# Patient Record
Sex: FEMALE | Race: OTHER | NOT HISPANIC OR LATINO | Employment: OTHER | URBAN - METROPOLITAN AREA
[De-identification: names, ages, dates, MRNs, and addresses within clinical notes are randomized per-mention and may not be internally consistent; named-entity substitution may affect disease eponyms.]

---

## 2017-02-03 ENCOUNTER — HISTORICAL (OUTPATIENT)
Dept: INTERNAL MEDICINE | Facility: CLINIC | Age: 42
End: 2017-02-03

## 2017-02-09 ENCOUNTER — HISTORICAL (OUTPATIENT)
Dept: RADIOLOGY | Facility: HOSPITAL | Age: 42
End: 2017-02-09

## 2017-02-23 ENCOUNTER — HISTORICAL (OUTPATIENT)
Dept: RADIOLOGY | Facility: HOSPITAL | Age: 42
End: 2017-02-23

## 2017-03-18 ENCOUNTER — HISTORICAL (OUTPATIENT)
Dept: SLEEP MEDICINE | Facility: HOSPITAL | Age: 42
End: 2017-03-18

## 2017-08-23 ENCOUNTER — HISTORICAL (OUTPATIENT)
Dept: ADMINISTRATIVE | Facility: HOSPITAL | Age: 42
End: 2017-08-23

## 2022-04-07 ENCOUNTER — HISTORICAL (OUTPATIENT)
Dept: ADMINISTRATIVE | Facility: HOSPITAL | Age: 47
End: 2022-04-07

## 2022-04-23 VITALS
HEIGHT: 67 IN | BODY MASS INDEX: 45.99 KG/M2 | SYSTOLIC BLOOD PRESSURE: 136 MMHG | DIASTOLIC BLOOD PRESSURE: 86 MMHG | WEIGHT: 293 LBS

## 2022-08-18 ENCOUNTER — HOSPITAL ENCOUNTER (EMERGENCY)
Facility: HOSPITAL | Age: 47
Discharge: HOME | End: 2022-08-18
Attending: EMERGENCY MEDICINE
Payer: MEDICARE

## 2022-08-18 VITALS
TEMPERATURE: 98 F | SYSTOLIC BLOOD PRESSURE: 147 MMHG | OXYGEN SATURATION: 98 % | HEART RATE: 88 BPM | HEIGHT: 67 IN | RESPIRATION RATE: 18 BRPM | DIASTOLIC BLOOD PRESSURE: 93 MMHG | BODY MASS INDEX: 45.99 KG/M2 | WEIGHT: 293 LBS

## 2022-08-18 DIAGNOSIS — F51.01 PRIMARY INSOMNIA: ICD-10-CM

## 2022-08-18 DIAGNOSIS — M72.2 PLANTAR FASCIITIS OF LEFT FOOT: Primary | ICD-10-CM

## 2022-08-18 PROCEDURE — 63600175 PHARM REV CODE 636 W HCPCS: Performed by: EMERGENCY MEDICINE

## 2022-08-18 PROCEDURE — 96372 THER/PROPH/DIAG INJ SC/IM: CPT | Performed by: EMERGENCY MEDICINE

## 2022-08-18 PROCEDURE — 99284 EMERGENCY DEPT VISIT MOD MDM: CPT | Mod: 25

## 2022-08-18 RX ORDER — IBUPROFEN 800 MG/1
800 TABLET ORAL EVERY 8 HOURS PRN
Qty: 20 TABLET | Refills: 0 | Status: SHIPPED | OUTPATIENT
Start: 2022-08-18

## 2022-08-18 RX ORDER — METHYLPREDNISOLONE SOD SUCC 125 MG
125 VIAL (EA) INJECTION ONCE
Status: COMPLETED | OUTPATIENT
Start: 2022-08-18 | End: 2022-08-18

## 2022-08-18 RX ADMIN — METHYLPREDNISOLONE SODIUM SUCCINATE 125 MG: 125 INJECTION, POWDER, FOR SOLUTION INTRAMUSCULAR; INTRAVENOUS at 09:08

## 2022-08-18 NOTE — Clinical Note
"Risa"Ana Brennan was seen and treated in our emergency department on 8/18/2022.  She may return to work on 08/20/2022.       If you have any questions or concerns, please don't hesitate to call.      Ruchi Argueta MD"

## 2022-08-18 NOTE — ED PROVIDER NOTES
Encounter Date: 8/18/2022       History     Chief Complaint   Patient presents with    foot pain     Pt to er c/o left foot pain onset months ago, denies injury.     47-year-old female complains of chronic left foot pain, particularly at the heel, worse when she 1st gets out of bed and steps down on the floor.  She also has developed over the last few days pain over the proximal left 5th metatarsal region and some swelling to the lateral left ankle.  She has no known injuries.  She has chronic back pain, chronic hip pain, and problems with insomnia as well.        Review of patient's allergies indicates:   Allergen Reactions    Codeine      No past medical history on file.  No past surgical history on file.  No family history on file.   PMHx:   Obesity, chronic back pain  PSurgHx:  Back surgery  Review of Systems   Musculoskeletal: Positive for arthralgias, back pain and joint swelling.   All other systems reviewed and are negative.      Physical Exam     Initial Vitals [08/18/22 0758]   BP Pulse Resp Temp SpO2   (!) 147/93 88 18 98.2 °F (36.8 °C) 98 %      MAP       --         Physical Exam    Nursing note and vitals reviewed.  Constitutional: She appears well-developed and well-nourished. She is not diaphoretic. No distress.   HENT:   Head: Normocephalic and atraumatic.   Cardiovascular: Intact distal pulses.   Pulmonary/Chest: No respiratory distress. She has no wheezes. She has no rhonchi. She has no rales.   Abdominal: She exhibits no distension. There is no abdominal tenderness. There is no guarding.   Musculoskeletal:         General: No tenderness or edema. Normal range of motion.      Comments: Mild swelling noted to left lateral ankle with mild tenderness inferior to the lateral malleolus and mild tenderness over the proximal 5th metatarsal region.  She also has mild tenderness to the heel and plantar fascia, no palpable abnormality.  Ankle and foot and toes has full range of motion, normal sensation, 2+  pedal pulse.     Neurological: She is alert and oriented to person, place, and time.   Skin: Skin is warm and dry. Capillary refill takes less than 2 seconds. No rash noted.   Psychiatric: She has a normal mood and affect. Thought content normal.         ED Course   Procedures  Labs Reviewed - No data to display       Imaging Results          X-Ray Foot Complete Left (In process)               X-Rays:   Independently Interpreted Readings:   Other Readings:  Preliminary reading of the left foot x-ray is negative.    Medications   methylPREDNISolone sodium succinate injection 125 mg (has no administration in time range)     Medical Decision Making:   ED Management:  Patient has chronic left foot pain, hip pain, and back pain.  She is currently trying to do a keto diet for weight loss I she does understand the excess weight is exacerbating her chronic pain.  She also has problems with insomnia, drinks a lot of coffee, takes frequent afternoon naps, and has other habits related to poor sleep.  I have discussed diet and sleep habits with her as a may way to manage her chronic pain.  She would like to do a cortisone shot to help with her back in joint pain and we have discussed this as only providing short-term relief, a day or to.  I will give her prescription for ibuprofen.  Recommended that she follow-up with her primary care provider.                        Clinical Impression:   Final diagnoses:  [M72.2] Plantar fasciitis of left foot (Primary)  [F51.01] Primary insomnia          ED Disposition Condition    Discharge Stable        ED Prescriptions     Medication Sig Dispense Start Date End Date Auth. Provider    ibuprofen (ADVIL,MOTRIN) 800 MG tablet Take 1 tablet (800 mg total) by mouth every 8 (eight) hours as needed for Pain. 20 tablet 8/18/2022  Ruchi Argueta MD        Follow-up Information     Follow up With Specialties Details Why Contact Info    PCP  Schedule an appointment as soon as possible for a  visit in 1 week             Ruchi Argueta MD  08/18/22 0904       Ruchi Argueta MD  08/18/22 0905       Ruchi Argueta MD  08/18/22 0906       Ruchi Argueta MD  08/18/22 0915

## 2023-05-11 ENCOUNTER — HOSPITAL ENCOUNTER (OUTPATIENT)
Dept: RADIOLOGY | Facility: HOSPITAL | Age: 48
Discharge: HOME | End: 2023-05-11
Attending: INTERNAL MEDICINE
Payer: MEDICARE

## 2023-05-11 DIAGNOSIS — M25.511 RIGHT SHOULDER PAIN: Primary | ICD-10-CM

## 2023-05-11 DIAGNOSIS — M54.50 LOWER BACK PAIN: ICD-10-CM

## 2023-05-11 DIAGNOSIS — M25.511 RIGHT SHOULDER PAIN: ICD-10-CM

## 2023-05-11 PROCEDURE — 73030 X-RAY EXAM OF SHOULDER: CPT | Mod: TC,RT

## 2023-05-11 PROCEDURE — 72100 X-RAY EXAM L-S SPINE 2/3 VWS: CPT | Mod: TC

## 2023-06-05 DIAGNOSIS — M48.061 LUMBAR SPINAL STENOSIS: Primary | ICD-10-CM

## 2023-06-05 DIAGNOSIS — M75.101 RIGHT ROTATOR CUFF TEAR: ICD-10-CM

## 2023-06-06 DIAGNOSIS — M48.061 LUMBAR STENOSIS: Primary | ICD-10-CM

## 2023-06-06 DIAGNOSIS — S40.011A CONTUSION OF RIGHT SHOULDER: ICD-10-CM

## 2023-06-25 ENCOUNTER — HOSPITAL ENCOUNTER (EMERGENCY)
Facility: HOSPITAL | Age: 48
Discharge: HOME | End: 2023-06-25
Attending: EMERGENCY MEDICINE | Admitting: NURSE PRACTITIONER
Payer: MEDICARE

## 2023-06-25 VITALS
HEIGHT: 67 IN | TEMPERATURE: 98 F | SYSTOLIC BLOOD PRESSURE: 165 MMHG | HEART RATE: 75 BPM | DIASTOLIC BLOOD PRESSURE: 99 MMHG | BODY MASS INDEX: 45.99 KG/M2 | RESPIRATION RATE: 18 BRPM | OXYGEN SATURATION: 98 % | WEIGHT: 293 LBS

## 2023-06-25 DIAGNOSIS — M25.571 RIGHT ANKLE PAIN: ICD-10-CM

## 2023-06-25 DIAGNOSIS — S93.401A SPRAIN OF RIGHT ANKLE, UNSPECIFIED LIGAMENT, INITIAL ENCOUNTER: Primary | ICD-10-CM

## 2023-06-25 PROCEDURE — 96372 THER/PROPH/DIAG INJ SC/IM: CPT | Performed by: NURSE PRACTITIONER

## 2023-06-25 PROCEDURE — 63600175 PHARM REV CODE 636 W HCPCS: Performed by: NURSE PRACTITIONER

## 2023-06-25 PROCEDURE — 99284 EMERGENCY DEPT VISIT MOD MDM: CPT

## 2023-06-25 RX ORDER — ERGOCALCIFEROL 1.25 MG/1
CAPSULE ORAL
COMMUNITY
Start: 2023-05-15

## 2023-06-25 RX ORDER — CYCLOBENZAPRINE HCL 10 MG
TABLET ORAL
COMMUNITY
Start: 2023-05-11

## 2023-06-25 RX ORDER — ALPRAZOLAM 1 MG/1
1 TABLET ORAL 2 TIMES DAILY PRN
COMMUNITY

## 2023-06-25 RX ORDER — DEXAMETHASONE SODIUM PHOSPHATE 4 MG/ML
8 INJECTION, SOLUTION INTRA-ARTICULAR; INTRALESIONAL; INTRAMUSCULAR; INTRAVENOUS; SOFT TISSUE
Status: COMPLETED | OUTPATIENT
Start: 2023-06-25 | End: 2023-06-25

## 2023-06-25 RX ORDER — ATORVASTATIN CALCIUM 40 MG/1
TABLET, FILM COATED ORAL
COMMUNITY
Start: 2023-05-15

## 2023-06-25 RX ORDER — HYDROCODONE BITARTRATE AND ACETAMINOPHEN 5; 325 MG/1; MG/1
1 TABLET ORAL EVERY 8 HOURS PRN
Qty: 12 TABLET | Refills: 0 | Status: SHIPPED | OUTPATIENT
Start: 2023-06-25 | End: 2023-06-29

## 2023-06-25 RX ORDER — ESCITALOPRAM OXALATE 20 MG/1
20 TABLET ORAL
COMMUNITY
Start: 2023-05-11

## 2023-06-25 RX ADMIN — DEXAMETHASONE SODIUM PHOSPHATE 8 MG: 4 INJECTION, SOLUTION INTRA-ARTICULAR; INTRALESIONAL; INTRAMUSCULAR; INTRAVENOUS; SOFT TISSUE at 03:06

## 2023-06-25 NOTE — DISCHARGE INSTRUCTIONS
Ice, elevate, walking boot. May take norco as needed for more severe pain, do not take and drive.

## 2023-06-25 NOTE — ED PROVIDER NOTES
Encounter Date: 2023       History     Chief Complaint   Patient presents with    Ankle Pain     Pt to er c/o pain to right ankle when getting out of a recliner three days ago. Pt states also has swelling and bruising to right ankle.     See MDM    The history is provided by the patient. No  was used.   Review of patient's allergies indicates:   Allergen Reactions    Codeine      Past Medical History:   Diagnosis Date    Arthritis     spine    Asthma     Bone spur     back    Bursitis of both hips     Chronic back pain     Morbid obesity      Past Surgical History:   Procedure Laterality Date    BACK SURGERY      L4     SECTION      x4    TONSILLECTOMY      TUBAL LIGATION       No family history on file.     Review of Systems   Musculoskeletal:  Positive for joint swelling (right ankle pain).   All other systems reviewed and are negative.    Physical Exam     Initial Vitals [23 1439]   BP Pulse Resp Temp SpO2   (!) 142/118 90 20 97.9 °F (36.6 °C) 98 %      MAP       --         Physical Exam    Nursing note and vitals reviewed.  Constitutional: She appears well-developed and well-nourished.   Cardiovascular:  Regular rhythm and intact distal pulses.           Pulmonary/Chest: No respiratory distress.   Musculoskeletal:      Right ankle: Swelling and ecchymosis present. No deformity. Tenderness present over the lateral malleolus. Decreased range of motion.      Left ankle: Normal.      Comments: All other adjacent joints otherwise normal       Neurological: She is alert and oriented to person, place, and time.   Skin: Skin is warm and dry.   Psychiatric: She has a normal mood and affect.       ED Course   Procedures  Labs Reviewed - No data to display       Imaging Results              X-Ray Ankle Complete Right (Final result)  Result time 23 15:18:54      Final result by Phi Roe MD (23 15:18:54)                   Impression:      1. No acute displaced  fracture or dislocation.      Electronically signed by: Phi Roe MD  Date:    06/25/2023  Time:    15:18               Narrative:    EXAMINATION:  XR ANKLE COMPLETE 3 VIEW RIGHT    CLINICAL HISTORY:  Right ankle pain.    TECHNIQUE:  Three views of the right ankle.    COMPARISON:  None    FINDINGS:  No acute displaced fracture, subluxation, or dislocation is identified.  Ankle mortise is preserved.  There is calcaneal spurring.  No radiopaque foreign bodies are identified.  There is subcutaneous edema diffusely.                                       Medications   dexAMETHasone injection 8 mg (has no administration in time range)     Medical Decision Making:   Differential Diagnosis:   Includes but not limited to ankle sprain, ankle fracture, foot sprain  Clinical Tests:   Radiological Study: Ordered and Reviewed  ED Management:  49 y/o female presents with rolling her right ankle Thursday when she went to stand from the recliner. She has hx neuropathy in her foot so she didn't realize it was numb and when she stood it rolled out and then rolled back in. She has been elevating, ice and trying to not walk on it a lot but states now bruising and still swollen so wants to make sure its okay     Xray neg. Will place in walking boot. She did now request steroid injection for her nerve pain from her back that is flaring up since she is walking funny due to the ankle. She denies being DM and states she hasn't had one in 2 years.                         Clinical Impression:   Final diagnoses:  [M25.571] Right ankle pain  [S93.401A] Sprain of right ankle, unspecified ligament, initial encounter (Primary)        ED Disposition Condition    Discharge Stable          ED Prescriptions       Medication Sig Dispense Start Date End Date Auth. Provider    HYDROcodone-acetaminophen (NORCO) 5-325 mg per tablet Take 1 tablet by mouth every 8 (eight) hours as needed for Pain. 12 tablet 6/25/2023 6/29/2023 [facility]           Follow-up Information       Follow up With Specialties Details Why Contact Info    Rachel Dey MD Internal Medicine Call in 1 week As needed, If symptoms worsen [address] [facility] [id number]  [phone]               Estefany Hurst, MONTSE  06/25/23 9118

## 2023-06-25 NOTE — ED TRIAGE NOTES
Pt to er c/o pain to right ankle when getting out of a recliner three days ago. Pt states also has swelling and bruising to right ankle.

## 2023-07-05 ENCOUNTER — APPOINTMENT (OUTPATIENT)
Dept: RADIOLOGY | Facility: HOSPITAL | Age: 48
End: 2023-07-05
Attending: INTERNAL MEDICINE
Payer: MEDICARE

## 2023-07-05 DIAGNOSIS — S40.011A CONTUSION OF RIGHT SHOULDER: ICD-10-CM

## 2023-07-05 PROCEDURE — 73221 MRI JOINT UPR EXTREM W/O DYE: CPT | Mod: TC,RT

## 2023-11-13 ENCOUNTER — HOSPITAL ENCOUNTER (OUTPATIENT)
Dept: RADIOLOGY | Facility: HOSPITAL | Age: 48
Discharge: HOME | End: 2023-11-13
Attending: FAMILY MEDICINE
Payer: MEDICARE

## 2023-11-13 ENCOUNTER — OFFICE VISIT (OUTPATIENT)
Dept: URGENT CARE | Facility: CLINIC | Age: 48
End: 2023-11-13
Payer: MEDICARE

## 2023-11-13 VITALS
DIASTOLIC BLOOD PRESSURE: 95 MMHG | HEIGHT: 66 IN | OXYGEN SATURATION: 99 % | SYSTOLIC BLOOD PRESSURE: 167 MMHG | BODY MASS INDEX: 47.09 KG/M2 | WEIGHT: 293 LBS | HEART RATE: 72 BPM | TEMPERATURE: 99 F | RESPIRATION RATE: 20 BRPM

## 2023-11-13 DIAGNOSIS — U07.1 COVID-19: Primary | ICD-10-CM

## 2023-11-13 DIAGNOSIS — R09.81 NASAL CONGESTION: ICD-10-CM

## 2023-11-13 DIAGNOSIS — U07.1 COVID-19: ICD-10-CM

## 2023-11-13 LAB
CTP QC/QA: YES
CTP QC/QA: YES
POC MOLECULAR INFLUENZA A AGN: NEGATIVE
POC MOLECULAR INFLUENZA B AGN: NEGATIVE
SARS-COV-2 RDRP RESP QL NAA+PROBE: POSITIVE

## 2023-11-13 PROCEDURE — 99215 OFFICE O/P EST HI 40 MIN: CPT | Mod: PBBFAC,25 | Performed by: FAMILY MEDICINE

## 2023-11-13 PROCEDURE — 99204 OFFICE O/P NEW MOD 45 MIN: CPT | Mod: S$PBB,,, | Performed by: FAMILY MEDICINE

## 2023-11-13 PROCEDURE — 87502 INFLUENZA DNA AMP PROBE: CPT | Mod: PBBFAC | Performed by: FAMILY MEDICINE

## 2023-11-13 PROCEDURE — 87635 SARS-COV-2 COVID-19 AMP PRB: CPT | Mod: PBBFAC | Performed by: FAMILY MEDICINE

## 2023-11-13 PROCEDURE — 99204 PR OFFICE/OUTPT VISIT, NEW, LEVL IV, 45-59 MIN: ICD-10-PCS | Mod: S$PBB,,, | Performed by: FAMILY MEDICINE

## 2023-11-13 PROCEDURE — 71046 X-RAY EXAM CHEST 2 VIEWS: CPT | Mod: TC

## 2023-11-13 RX ORDER — ALBUTEROL SULFATE 90 UG/1
2 AEROSOL, METERED RESPIRATORY (INHALATION) EVERY 6 HOURS PRN
Qty: 1 G | Refills: 3 | Status: SHIPPED | OUTPATIENT
Start: 2023-11-13 | End: 2024-03-16 | Stop reason: SDUPTHER

## 2023-11-13 NOTE — PROGRESS NOTES
"Subjective:       Patient ID: Risa Brennan is a 48 y.o. female.    Vitals:  height is 5' 6" (1.676 m) and weight is 160.6 kg (354 lb) (abnormal). Her oral temperature is 99.1 °F (37.3 °C). Her blood pressure is 169/114 (abnormal) and her pulse is 72. Her respiration is 20 and oxygen saturation is 99%.     Chief Complaint: URI (Fever, congestion , chest discomfort when breathing for 8 days)    Patient with 7 8 days of symptoms, slowly improving.  Began with sinus congestion which is nearly resolved, continues with cough, occasional pleuritic type chest pain, no sputum production.  No exertional chest pain.  States she has a history of asthma and her asthma medications were old.  Requesting a refill.  Denies neuro symptoms.  States a history of elevated blood pressure in the past.    URI   Associated symptoms include congestion, coughing and wheezing. Pertinent negatives include no abdominal pain, joint pain, neck pain, sinus pain, sore throat, swollen glands or vomiting.         HENT:  Positive for congestion. Negative for sinus pain and sore throat.    Neck: Negative for neck pain.   Respiratory:  Positive for cough and wheezing.    Gastrointestinal:  Negative for abdominal pain and vomiting.       Objective:   Physical Exam   Constitutional: She appears well-developed.  Non-toxic appearance. She does not appear ill. No distress.   HENT:   Head: Atraumatic.   Nose: No purulent discharge. Right sinus exhibits no maxillary sinus tenderness and no frontal sinus tenderness. Left sinus exhibits no maxillary sinus tenderness and no frontal sinus tenderness.   Mouth/Throat: Uvula is midline and mucous membranes are normal. No posterior oropharyngeal edema or tonsillar abscesses.   Eyes: Right eye exhibits no discharge. Left eye exhibits no discharge. Extraocular movement intact   Neck: Neck supple. No neck rigidity present.   Cardiovascular: Regular rhythm.   Pulmonary/Chest: Effort normal and breath sounds " normal. No stridor. No respiratory distress. She has no wheezes. She has no rhonchi. She has no rales. She exhibits no tenderness.   Abdominal: Soft.   Lymphadenopathy:     She has no cervical adenopathy.   Neurological: She is alert.   Skin: Skin is warm, dry and not diaphoretic.   Psychiatric: Her behavior is normal.   Nursing note and vitals reviewed.    Results for orders placed or performed in visit on 11/13/23   POCT COVID-19 Rapid Screening   Result Value Ref Range    POC Rapid COVID Positive (A) Negative     Acceptable Yes    POCT Influenza A/B Molecular   Result Value Ref Range    POC Molecular Influenza A Ag Negative Negative, Not Reported    POC Molecular Influenza B Ag Negative Negative, Not Reported     Acceptable Yes          XR CHEST PA AND LATERAL    Result Date: 11/13/2023  EXAMINATION: XR CHEST PA AND LATERAL CLINICAL HISTORY: COVID-19 TECHNIQUE: Two views of the chest COMPARISON: 07/20/2019 FINDINGS: Improved aeration of the right mid lung zone. The cardiomediastinal silhouette is within normal limits. No acute osseous abnormality.     No acute cardiopulmonary process. Electronically signed by: Tyrone Melo Date:    11/13/2023 Time:    11:15     Assessment:     1. COVID-19    2. Nasal congestion          Plan:   Repeat BP improved.  Patient will follow-up with PCP to address.  Refilled albuterol as requested.  Had a lengthy discussion about COVID-19 contagious precautions and ER precautions.  She has good insight and voiced understanding.    COVID-19  -     XR CHEST PA AND LATERAL; Future; Expected date: 11/13/2023    Nasal congestion  -     POCT COVID-19 Rapid Screening  -     POCT Influenza A/B Molecular        Please note: This chart was completed via voice to text dictation. It may contain typographical/word recognition errors. If there are any questions, please contact the provider for final clarification.

## 2023-11-16 ENCOUNTER — PATIENT MESSAGE (OUTPATIENT)
Dept: RESEARCH | Facility: HOSPITAL | Age: 48
End: 2023-11-16
Payer: MEDICARE

## 2024-03-16 ENCOUNTER — OFFICE VISIT (OUTPATIENT)
Dept: URGENT CARE | Facility: CLINIC | Age: 49
End: 2024-03-16
Payer: MEDICARE

## 2024-03-16 VITALS
TEMPERATURE: 99 F | BODY MASS INDEX: 47.09 KG/M2 | SYSTOLIC BLOOD PRESSURE: 160 MMHG | HEART RATE: 83 BPM | HEIGHT: 66 IN | WEIGHT: 293 LBS | DIASTOLIC BLOOD PRESSURE: 109 MMHG | RESPIRATION RATE: 20 BRPM | OXYGEN SATURATION: 96 %

## 2024-03-16 DIAGNOSIS — R09.89 CHEST RALES: ICD-10-CM

## 2024-03-16 DIAGNOSIS — R05.9 COUGH IN ADULT PATIENT: ICD-10-CM

## 2024-03-16 DIAGNOSIS — R07.89 COSTOCHONDRAL CHEST PAIN: ICD-10-CM

## 2024-03-16 DIAGNOSIS — H66.92 LEFT OTITIS MEDIA, UNSPECIFIED OTITIS MEDIA TYPE: Primary | ICD-10-CM

## 2024-03-16 PROCEDURE — 99213 OFFICE O/P EST LOW 20 MIN: CPT | Mod: S$PBB,,,

## 2024-03-16 PROCEDURE — 99215 OFFICE O/P EST HI 40 MIN: CPT | Mod: PBBFAC

## 2024-03-16 RX ORDER — AMOXICILLIN AND CLAVULANATE POTASSIUM 875; 125 MG/1; MG/1
1 TABLET, FILM COATED ORAL EVERY 12 HOURS
Qty: 20 TABLET | Refills: 0 | Status: SHIPPED | OUTPATIENT
Start: 2024-03-16 | End: 2024-03-26

## 2024-03-16 RX ORDER — METHYLPREDNISOLONE 4 MG/1
TABLET ORAL
Qty: 21 EACH | Refills: 0 | Status: SHIPPED | OUTPATIENT
Start: 2024-03-16

## 2024-03-16 RX ORDER — ALBUTEROL SULFATE 90 UG/1
2 AEROSOL, METERED RESPIRATORY (INHALATION) EVERY 6 HOURS PRN
Qty: 1 G | Refills: 3 | Status: SHIPPED | OUTPATIENT
Start: 2024-03-16 | End: 2024-04-15

## 2024-03-16 RX ORDER — DICLOFENAC SODIUM 75 MG/1
75 TABLET, DELAYED RELEASE ORAL 2 TIMES DAILY
Qty: 14 TABLET | Refills: 0 | Status: SHIPPED | OUTPATIENT
Start: 2024-03-16 | End: 2024-03-23

## 2024-03-16 NOTE — PROGRESS NOTES
"Subjective:       Patient ID: Risa Brennan is a 48 y.o. female.    Vitals:  height is 5' 6" (1.676 m) and weight is 166.9 kg (368 lb) (abnormal). Her oral temperature is 99.2 °F (37.3 °C). Her blood pressure is 160/109 (abnormal) and her pulse is 83. Her respiration is 20 and oxygen saturation is 96%.     Chief Complaint: URI (Cough, nasal congestion, chest congestion and pain x 2 weeks. Left ear pain started this morning.)    Agree with chief complaint, reports symptoms x2 weeks with no improvement.  States she has tried TheraFlu.  Reports cough is productive with green/yellowish sputum.    URI   Associated symptoms include chest pain, congestion, coughing and ear pain.       Constitution: Negative for appetite change and chills.   HENT:  Positive for ear pain and congestion.    Cardiovascular:  Positive for chest pain. Negative for palpitations and sob on exertion.        Pleuritic chest pain    Respiratory:  Positive for cough and sputum production.        Objective:      Physical Exam   Constitutional: She is oriented to person, place, and time. She is cooperative. She is easily aroused. She does not appear ill. obesityawake  HENT:   Head: Normocephalic and atraumatic.   Ears:   Right Ear: Tympanic membrane normal.   Left Ear: There is tenderness. Tympanic membrane is injected.   Nose: Mucosal edema present.   Mouth/Throat: Uvula is midline and mucous membranes are normal. Posterior oropharyngeal erythema present. Tonsils are 0 on the right. Tonsils are 0 on the left.   +pnd      Comments: +pnd  Eyes: Conjunctivae and lids are normal.   Neck: Neck supple.   Cardiovascular: Normal rate, regular rhythm, S1 normal, S2 normal and normal heart sounds.   Pulmonary/Chest: Effort normal. She has rales. She exhibits tenderness.   Rales cleared with cough              Comments: Rales cleared with cough     Abdominal: Normal appearance.   Lymphadenopathy:     She has no cervical adenopathy.   Neurological: " She is alert, oriented to person, place, and time and easily aroused. Gait normal. GCS eye subscore is 4. GCS verbal subscore is 5. GCS motor subscore is 6.   Skin: Skin is warm, dry and intact. Capillary refill takes less than 2 seconds.   Psychiatric: Her behavior is normal.   Nursing note and vitals reviewed.        Assessment:       1. Left otitis media, unspecified otitis media type    2. Costochondral chest pain    3. Cough in adult patient    4. Chest rales          Plan:     No inhouse testing warranted for today's visit.  Augmentin sent for ear infection.  Encouraged patient to not take any other NSAIDs with diclofenac.  Strict ED precautions discussed, patient appears stable for discharge.    Left otitis media, unspecified otitis media type  -     amoxicillin-clavulanate 875-125mg (AUGMENTIN) 875-125 mg per tablet; Take 1 tablet by mouth every 12 (twelve) hours. for 10 days  Dispense: 20 tablet; Refill: 0    Costochondral chest pain  -     diclofenac (VOLTAREN) 75 MG EC tablet; Take 1 tablet (75 mg total) by mouth 2 (two) times daily. for 7 days  Dispense: 14 tablet; Refill: 0    Cough in adult patient  -     methylPREDNISolone (MEDROL DOSEPACK) 4 mg tablet; use as directed  Dispense: 21 each; Refill: 0    Chest rales  -     methylPREDNISolone (MEDROL DOSEPACK) 4 mg tablet; use as directed  Dispense: 21 each; Refill: 0  -     albuterol (PROVENTIL HFA) 90 mcg/actuation inhaler; Inhale 2 puffs into the lungs every 6 (six) hours as needed for Wheezing. Rescue  Dispense: 1 g; Refill: 3

## 2024-11-08 ENCOUNTER — HOSPITAL ENCOUNTER (OUTPATIENT)
Dept: RADIOLOGY | Facility: HOSPITAL | Age: 49
Discharge: HOME | End: 2024-11-08
Attending: NURSE PRACTITIONER
Payer: MEDICARE

## 2024-11-08 DIAGNOSIS — Z12.31 OTHER SCREENING MAMMOGRAM: ICD-10-CM

## 2024-11-08 PROCEDURE — 77067 SCR MAMMO BI INCL CAD: CPT | Mod: TC

## 2024-11-08 PROCEDURE — 77067 SCR MAMMO BI INCL CAD: CPT | Mod: 26,,, | Performed by: RADIOLOGY

## 2024-11-08 PROCEDURE — 77063 BREAST TOMOSYNTHESIS BI: CPT | Mod: 26,,, | Performed by: RADIOLOGY

## 2025-07-07 DIAGNOSIS — G47.30 SLEEP APNEA: Primary | ICD-10-CM
